# Patient Record
Sex: MALE | Race: WHITE
[De-identification: names, ages, dates, MRNs, and addresses within clinical notes are randomized per-mention and may not be internally consistent; named-entity substitution may affect disease eponyms.]

---

## 2020-11-19 ENCOUNTER — HOSPITAL ENCOUNTER (EMERGENCY)
Dept: HOSPITAL 76 - ED | Age: 45
Discharge: HOME | End: 2020-11-19
Payer: SELF-PAY

## 2020-11-19 VITALS — SYSTOLIC BLOOD PRESSURE: 140 MMHG | DIASTOLIC BLOOD PRESSURE: 89 MMHG

## 2020-11-19 DIAGNOSIS — S40.012A: Primary | ICD-10-CM

## 2020-11-19 DIAGNOSIS — S20.212A: ICD-10-CM
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
## 2020-11-19 PROCEDURE — 73010 X-RAY EXAM OF SHOULDER BLADE: CPT

## 2020-11-19 PROCEDURE — 71101 X-RAY EXAM UNILAT RIBS/CHEST: CPT

## 2020-11-19 PROCEDURE — 99284 EMERGENCY DEPT VISIT MOD MDM: CPT

## 2020-11-19 NOTE — XRAY REPORT
PROCEDURE:  Ribs w/PA Chest LT

 

INDICATIONS:  back/shoulder injury

 

TECHNIQUE:  2 views of the left ribs were acquired, along with a single view chest.  

 

COMPARISON:  None

 

FINDINGS:  

 

Surgical changes and devices:  None.  

 

Bones and chest wall:  No fractures or dislocations. No acute rib fracture seen. No suspicious bony l
esions.  Overlying soft tissues appear unremarkable.  

 

Lungs and pleura:  No pleural effusions or pneumothorax.  Lungs appear clear.  

 

Mediastinum:  Mediastinal contours appear normal.  Heart size is normal.  

 

IMPRESSION:  

Chest without acute cardiopulmonary abnormalities. No acute rib fractures identified.

 

Reviewed by: Da Bay MD on 11/19/2020 1:29 PM PST

Approved by: Da Bay MD on 11/19/2020 1:29 PM PST

 

 

Station ID:  [id number]

## 2020-11-19 NOTE — XRAY REPORT
PROCEDURE:  Scapula 2 View LT

 

INDICATIONS:  back/shoulder injury

 

TECHNIQUE:  3 views of the scapula were acquired.  

 

COMPARISON:  None

 

FINDINGS:  

 

Bones:  No acute fractures or dislocations.  No suspicious bony lesions.  Visualized ribs appear inta
ct. No rib fractures noted by the skin BB marker. 

 

Soft tissues:  Overlying soft tissues appear normal.  

 

IMPRESSION:  

Left shoulder without acute fracture or dislocation.

 

Reviewed by: Da Bay MD on 11/19/2020 1:38 PM PST

Approved by: Da Bay MD on 11/19/2020 1:38 PM PST

 

 

Station ID:  [id number]

## 2020-11-19 NOTE — ED PHYSICIAN DOCUMENTATION
PD HPI UPPER EXT INJURY





- Stated complaint


Stated Complaint: LT SHOULDER PX





- Chief complaint


Chief Complaint: Ext Problem





- History obtained from


History obtained from: Patient (He fell 2 days ago backwards onto his left 

shoulder and has persistent moderate pain there.  No other injuries.)





Review of Systems


Constitutional: reports: Reviewed and negative


Nose: reports: Reviewed and negative


Throat: reports: Reviewed and negative





PD PAST MEDICAL HISTORY





- Present Medications


Home Medications: 


                                Ambulatory Orders











 Medication  Instructions  Recorded  Confirmed


 


HYDROcod/ACETAM 5/325 [Norco 5/325] 1 - 2 tab PO Q6H PRN #15 tablet 11/19/20 














- Allergies


Allergies/Adverse Reactions: 


                                    Allergies











Allergy/AdvReac Type Severity Reaction Status Date / Time


 


No Known Drug Allergies Allergy   Verified 11/19/20 12:14














PD ED PE NORMAL





- Vitals


Vital signs reviewed: Yes





- General


General: Alert and oriented X 3, No acute distress





- HEENT


HEENT: PERRL, EOMI





- Neck


Neck: Supple, no meningeal sign, No bony TTP





- Back


Back: No spinal TTP





- Extremities


Extremities: Other (The shoulder itself is nontender, he is tender to the left 

scapula and potentially the ribs underneath it.  No midline spinal tenderness.  

He is only able to abduct to about 90 degrees.  Internal rotation is painless, 

external rotation on the extreme is painful.)





- Neuro


Neuro: Alert and oriented X 3, Normal speech





Results





- Vitals


Vitals: 


                               Vital Signs - 24 hr











  11/19/20





  12:14


 


Temperature 36.7 C


 


Heart Rate 84


 


Respiratory 18





Rate 


 


Blood Pressure 139/106 H


 


O2 Saturation 100








                                     Oxygen











O2 Source                      Room air

















- Rads (name of study)


  ** L rib and scapula XRs'


Radiology: EMP read contemporaneously (NAD)





Departure





- Departure


Disposition: 01 Home, Self Care


Clinical Impression: 


Contusion of left scapula


Qualifiers:


 Encounter type: initial encounter Qualified Code(s): S40.012A - Contusion of 

left shoulder, initial encounter





Contusion of rib on left side


Qualifiers:


 Encounter type: initial encounter Qualified Code(s): S20.212A - Contusion of 

left front wall of thorax, initial encounter





Condition: Good


Record reviewed to determine appropriate education?: Yes


Instructions:  ED Contusion Rib


Prescriptions: 


HYDROcod/ACETAM 5/325 [Norco 5/325] 1 - 2 tab PO Q6H PRN #15 tablet


 PRN Reason: Pain


Comments: 


No evidence of fracture on the x-rays, although subtle fractures of ribs or 

scapula can be missed but do not generally require specific treatment.  Return 

if worsening.  Follow-up with your doctor in a week if not better.